# Patient Record
Sex: FEMALE | Race: WHITE | NOT HISPANIC OR LATINO
[De-identification: names, ages, dates, MRNs, and addresses within clinical notes are randomized per-mention and may not be internally consistent; named-entity substitution may affect disease eponyms.]

---

## 2017-02-21 ENCOUNTER — MESSAGE (OUTPATIENT)
Age: 47
End: 2017-02-21

## 2017-02-24 LAB
MEV IGG FLD QL IA: 18.9 AU/ML
MEV IGG+IGM SER-IMP: NEGATIVE
MUV AB SER-ACNC: POSITIVE
MUV IGG SER QL IA: 269 AU/ML
RUBV IGG FLD-ACNC: 11.8 INDEX
RUBV IGG SER-IMP: POSITIVE

## 2017-02-27 ENCOUNTER — APPOINTMENT (OUTPATIENT)
Dept: INTERNAL MEDICINE | Facility: CLINIC | Age: 47
End: 2017-02-27

## 2017-02-27 ENCOUNTER — OTHER (OUTPATIENT)
Age: 47
End: 2017-02-27

## 2017-03-06 ENCOUNTER — MESSAGE (OUTPATIENT)
Age: 47
End: 2017-03-06

## 2017-05-02 ENCOUNTER — APPOINTMENT (OUTPATIENT)
Dept: INTERNAL MEDICINE | Facility: CLINIC | Age: 47
End: 2017-05-02

## 2017-05-02 VITALS
HEART RATE: 57 BPM | OXYGEN SATURATION: 100 % | WEIGHT: 110 LBS | TEMPERATURE: 98.4 F | DIASTOLIC BLOOD PRESSURE: 71 MMHG | RESPIRATION RATE: 17 BRPM | HEIGHT: 64 IN | SYSTOLIC BLOOD PRESSURE: 104 MMHG | BODY MASS INDEX: 18.78 KG/M2

## 2017-06-04 ENCOUNTER — LABORATORY RESULT (OUTPATIENT)
Age: 47
End: 2017-06-04

## 2017-06-05 LAB
25(OH)D3 SERPL-MCNC: 32.1 NG/ML
ALBUMIN SERPL ELPH-MCNC: 3.9 G/DL
ALP BLD-CCNC: 36 U/L
ALT SERPL-CCNC: 21 U/L
ANION GAP SERPL CALC-SCNC: 16 MMOL/L
APPEARANCE: CLEAR
AST SERPL-CCNC: 20 U/L
BASOPHILS # BLD AUTO: 0.03 K/UL
BASOPHILS NFR BLD AUTO: 0.7 %
BILIRUB SERPL-MCNC: 0.4 MG/DL
BILIRUBIN URINE: NEGATIVE
BLOOD URINE: NEGATIVE
BUN SERPL-MCNC: 18 MG/DL
CALCIUM SERPL-MCNC: 9.3 MG/DL
CHLORIDE SERPL-SCNC: 101 MMOL/L
CHOLEST SERPL-MCNC: 125 MG/DL
CHOLEST/HDLC SERPL: 1.6 RATIO
CO2 SERPL-SCNC: 23 MMOL/L
COLOR: YELLOW
CREAT SERPL-MCNC: 0.81 MG/DL
EOSINOPHIL # BLD AUTO: 0.08 K/UL
EOSINOPHIL NFR BLD AUTO: 1.8 %
FOLATE SERPL-MCNC: 13.6 NG/ML
GLUCOSE QUALITATIVE U: NORMAL MG/DL
GLUCOSE SERPL-MCNC: 68 MG/DL
HBA1C MFR BLD HPLC: 5.3 %
HCT VFR BLD CALC: 38.1 %
HDLC SERPL-MCNC: 76 MG/DL
HGB BLD-MCNC: 12.1 G/DL
IMM GRANULOCYTES NFR BLD AUTO: 0.2 %
KETONES URINE: NEGATIVE
LDLC SERPL CALC-MCNC: 43 MG/DL
LEUKOCYTE ESTERASE URINE: NEGATIVE
LYMPHOCYTES # BLD AUTO: 1.91 K/UL
LYMPHOCYTES NFR BLD AUTO: 42.4 %
MAN DIFF?: NORMAL
MCHC RBC-ENTMCNC: 29.5 PG
MCHC RBC-ENTMCNC: 31.8 GM/DL
MCV RBC AUTO: 92.9 FL
MONOCYTES # BLD AUTO: 0.34 K/UL
MONOCYTES NFR BLD AUTO: 7.6 %
NEUTROPHILS # BLD AUTO: 2.13 K/UL
NEUTROPHILS NFR BLD AUTO: 47.3 %
NITRITE URINE: NEGATIVE
PH URINE: 7.5
PLATELET # BLD AUTO: 142 K/UL
POTASSIUM SERPL-SCNC: 4.6 MMOL/L
PROT SERPL-MCNC: 6.4 G/DL
PROTEIN URINE: NEGATIVE MG/DL
RBC # BLD: 4.1 M/UL
RBC # FLD: 12.8 %
SODIUM SERPL-SCNC: 140 MMOL/L
SPECIFIC GRAVITY URINE: 1.01
TRIGL SERPL-MCNC: 31 MG/DL
TSH SERPL-ACNC: 2.03 UIU/ML
UROBILINOGEN URINE: NORMAL MG/DL
VIT B12 SERPL-MCNC: 396 PG/ML
WBC # FLD AUTO: 4.5 K/UL

## 2017-12-26 ENCOUNTER — MESSAGE (OUTPATIENT)
Age: 47
End: 2017-12-26

## 2018-01-04 LAB
BASOPHILS # BLD AUTO: 0.03 K/UL
BASOPHILS NFR BLD AUTO: 0.5 %
EOSINOPHIL # BLD AUTO: 0.07 K/UL
EOSINOPHIL NFR BLD AUTO: 1.2 %
HCT VFR BLD CALC: 37.4 %
HGB BLD-MCNC: 12.4 G/DL
IMM GRANULOCYTES NFR BLD AUTO: 0.5 %
LYMPHOCYTES # BLD AUTO: 2.5 K/UL
LYMPHOCYTES NFR BLD AUTO: 42.7 %
MAN DIFF?: NORMAL
MCHC RBC-ENTMCNC: 30.2 PG
MCHC RBC-ENTMCNC: 33.2 GM/DL
MCV RBC AUTO: 91 FL
MONOCYTES # BLD AUTO: 0.44 K/UL
MONOCYTES NFR BLD AUTO: 7.5 %
NEUTROPHILS # BLD AUTO: 2.78 K/UL
NEUTROPHILS NFR BLD AUTO: 47.6 %
PLATELET # BLD AUTO: 139 K/UL
RBC # BLD: 4.11 M/UL
RBC # FLD: 13 %
WBC # FLD AUTO: 5.85 K/UL

## 2018-02-28 ENCOUNTER — TRANSCRIPTION ENCOUNTER (OUTPATIENT)
Age: 48
End: 2018-02-28

## 2018-07-24 ENCOUNTER — FORM ENCOUNTER (OUTPATIENT)
Age: 48
End: 2018-07-24

## 2018-07-25 ENCOUNTER — APPOINTMENT (OUTPATIENT)
Dept: INTERNAL MEDICINE | Facility: CLINIC | Age: 48
End: 2018-07-25
Payer: COMMERCIAL

## 2018-07-25 ENCOUNTER — APPOINTMENT (OUTPATIENT)
Dept: RADIOLOGY | Facility: CLINIC | Age: 48
End: 2018-07-25
Payer: COMMERCIAL

## 2018-07-25 ENCOUNTER — OUTPATIENT (OUTPATIENT)
Dept: OUTPATIENT SERVICES | Facility: HOSPITAL | Age: 48
LOS: 1 days | End: 2018-07-25
Payer: COMMERCIAL

## 2018-07-25 VITALS
TEMPERATURE: 97.3 F | RESPIRATION RATE: 17 BRPM | SYSTOLIC BLOOD PRESSURE: 110 MMHG | HEIGHT: 64 IN | DIASTOLIC BLOOD PRESSURE: 70 MMHG | OXYGEN SATURATION: 100 % | WEIGHT: 111 LBS | HEART RATE: 49 BPM | BODY MASS INDEX: 18.95 KG/M2

## 2018-07-25 DIAGNOSIS — Z00.00 ENCOUNTER FOR GENERAL ADULT MEDICAL EXAMINATION WITHOUT ABNORMAL FINDINGS: ICD-10-CM

## 2018-07-25 PROCEDURE — 71046 X-RAY EXAM CHEST 2 VIEWS: CPT | Mod: 26

## 2018-07-25 PROCEDURE — 99396 PREV VISIT EST AGE 40-64: CPT

## 2018-07-25 PROCEDURE — 71046 X-RAY EXAM CHEST 2 VIEWS: CPT

## 2018-07-30 LAB
25(OH)D3 SERPL-MCNC: 30 NG/ML
ALBUMIN SERPL ELPH-MCNC: 4.5 G/DL
ALP BLD-CCNC: 35 U/L
ALT SERPL-CCNC: 15 U/L
ANION GAP SERPL CALC-SCNC: 12 MMOL/L
AST SERPL-CCNC: 19 U/L
BASOPHILS # BLD AUTO: 0.03 K/UL
BASOPHILS NFR BLD AUTO: 0.7 %
BILIRUB SERPL-MCNC: 0.7 MG/DL
BUN SERPL-MCNC: 17 MG/DL
CALCIUM SERPL-MCNC: 8.8 MG/DL
CHLORIDE SERPL-SCNC: 101 MMOL/L
CHOLEST SERPL-MCNC: 131 MG/DL
CHOLEST/HDLC SERPL: 1.7 RATIO
CO2 SERPL-SCNC: 24 MMOL/L
CREAT SERPL-MCNC: 0.82 MG/DL
EOSINOPHIL # BLD AUTO: 0.06 K/UL
EOSINOPHIL NFR BLD AUTO: 1.5 %
GLUCOSE SERPL-MCNC: 90 MG/DL
HBA1C MFR BLD HPLC: 4.9 %
HCT VFR BLD CALC: 37.6 %
HCV AB SER QL: NONREACTIVE
HCV S/CO RATIO: 0.11 S/CO
HDLC SERPL-MCNC: 77 MG/DL
HGB BLD-MCNC: 12.6 G/DL
IMM GRANULOCYTES NFR BLD AUTO: 0.2 %
LDLC SERPL CALC-MCNC: 40 MG/DL
LYMPHOCYTES # BLD AUTO: 1.72 K/UL
LYMPHOCYTES NFR BLD AUTO: 42.2 %
MAN DIFF?: NORMAL
MCHC RBC-ENTMCNC: 30.2 PG
MCHC RBC-ENTMCNC: 33.5 GM/DL
MCV RBC AUTO: 90.2 FL
MONOCYTES # BLD AUTO: 0.28 K/UL
MONOCYTES NFR BLD AUTO: 6.9 %
NEUTROPHILS # BLD AUTO: 1.98 K/UL
NEUTROPHILS NFR BLD AUTO: 48.5 %
PLATELET # BLD AUTO: 150 K/UL
POTASSIUM SERPL-SCNC: 4.3 MMOL/L
PROT SERPL-MCNC: 6.3 G/DL
RBC # BLD: 4.17 M/UL
RBC # FLD: 13 %
SODIUM SERPL-SCNC: 138 MMOL/L
TRIGL SERPL-MCNC: 69 MG/DL
TSH SERPL-ACNC: 1.39 UIU/ML
VIT B12 SERPL-MCNC: 356 PG/ML
WBC # FLD AUTO: 4.08 K/UL

## 2018-07-31 LAB
M TB IFN-G BLD-IMP: NEGATIVE
QUANTIFERON TB PLUS MITOGEN MINUS NIL: >10 IU/ML
QUANTIFERON TB PLUS NIL: 0.03 IU/ML
QUANTIFERON TB PLUS TB1 MINUS NIL: 0 IU/ML
QUANTIFERON TB PLUS TB2 MINUS NIL: -0.01 IU/ML

## 2018-09-25 ENCOUNTER — APPOINTMENT (OUTPATIENT)
Dept: CARDIOLOGY | Facility: CLINIC | Age: 48
End: 2018-09-25
Payer: COMMERCIAL

## 2018-09-25 ENCOUNTER — NON-APPOINTMENT (OUTPATIENT)
Age: 48
End: 2018-09-25

## 2018-09-25 VITALS
HEIGHT: 64 IN | BODY MASS INDEX: 18.61 KG/M2 | RESPIRATION RATE: 17 BRPM | WEIGHT: 109 LBS | HEART RATE: 55 BPM | DIASTOLIC BLOOD PRESSURE: 65 MMHG | OXYGEN SATURATION: 100 % | SYSTOLIC BLOOD PRESSURE: 103 MMHG | TEMPERATURE: 98.3 F

## 2018-09-25 PROCEDURE — 93000 ELECTROCARDIOGRAM COMPLETE: CPT

## 2018-09-25 PROCEDURE — 99244 OFF/OP CNSLTJ NEW/EST MOD 40: CPT

## 2018-10-09 ENCOUNTER — APPOINTMENT (OUTPATIENT)
Dept: CARDIOLOGY | Facility: CLINIC | Age: 48
End: 2018-10-09
Payer: COMMERCIAL

## 2018-10-09 VITALS
HEIGHT: 64 IN | SYSTOLIC BLOOD PRESSURE: 108 MMHG | TEMPERATURE: 98.6 F | DIASTOLIC BLOOD PRESSURE: 73 MMHG | RESPIRATION RATE: 17 BRPM | WEIGHT: 110 LBS | BODY MASS INDEX: 18.78 KG/M2 | HEART RATE: 85 BPM | OXYGEN SATURATION: 100 %

## 2018-10-09 PROCEDURE — 99212 OFFICE O/P EST SF 10 MIN: CPT

## 2018-10-09 PROCEDURE — 93306 TTE W/DOPPLER COMPLETE: CPT

## 2019-09-15 LAB
ALBUMIN SERPL ELPH-MCNC: 4.3 G/DL
ALP BLD-CCNC: 31 U/L
ALT SERPL-CCNC: 16 U/L
ANION GAP SERPL CALC-SCNC: 11 MMOL/L
AST SERPL-CCNC: 19 U/L
BASOPHILS # BLD AUTO: 0.06 K/UL
BASOPHILS NFR BLD AUTO: 1.2 %
BILIRUB SERPL-MCNC: 0.3 MG/DL
BUN SERPL-MCNC: 13 MG/DL
CALCIUM SERPL-MCNC: 9 MG/DL
CHLORIDE SERPL-SCNC: 104 MMOL/L
CHOLEST SERPL-MCNC: 150 MG/DL
CHOLEST/HDLC SERPL: 1.6 RATIO
CO2 SERPL-SCNC: 25 MMOL/L
CREAT SERPL-MCNC: 0.77 MG/DL
EOSINOPHIL # BLD AUTO: 0.07 K/UL
EOSINOPHIL NFR BLD AUTO: 1.4 %
ESTIMATED AVERAGE GLUCOSE: 97 MG/DL
GLUCOSE SERPL-MCNC: 86 MG/DL
HBA1C MFR BLD HPLC: 5 %
HCT VFR BLD CALC: 40.5 %
HDLC SERPL-MCNC: 94 MG/DL
HGB BLD-MCNC: 13 G/DL
IMM GRANULOCYTES NFR BLD AUTO: 0.2 %
LDLC SERPL CALC-MCNC: 50 MG/DL
LYMPHOCYTES # BLD AUTO: 1.7 K/UL
LYMPHOCYTES NFR BLD AUTO: 34.3 %
MAN DIFF?: NORMAL
MCHC RBC-ENTMCNC: 30.3 PG
MCHC RBC-ENTMCNC: 32.1 GM/DL
MCV RBC AUTO: 94.4 FL
MONOCYTES # BLD AUTO: 0.45 K/UL
MONOCYTES NFR BLD AUTO: 9.1 %
NEUTROPHILS # BLD AUTO: 2.67 K/UL
NEUTROPHILS NFR BLD AUTO: 53.8 %
PLATELET # BLD AUTO: 158 K/UL
POTASSIUM SERPL-SCNC: 4.2 MMOL/L
PROT SERPL-MCNC: 6.2 G/DL
RBC # BLD: 4.29 M/UL
RBC # FLD: 12.7 %
SODIUM SERPL-SCNC: 140 MMOL/L
TRIGL SERPL-MCNC: 32 MG/DL
TSH SERPL-ACNC: 1.77 UIU/ML
WBC # FLD AUTO: 4.96 K/UL

## 2019-09-19 ENCOUNTER — APPOINTMENT (OUTPATIENT)
Dept: INTERNAL MEDICINE | Facility: CLINIC | Age: 49
End: 2019-09-19
Payer: COMMERCIAL

## 2019-09-19 VITALS
BODY MASS INDEX: 18.78 KG/M2 | HEIGHT: 64 IN | HEART RATE: 55 BPM | DIASTOLIC BLOOD PRESSURE: 73 MMHG | WEIGHT: 110 LBS | TEMPERATURE: 98.2 F | RESPIRATION RATE: 17 BRPM | SYSTOLIC BLOOD PRESSURE: 112 MMHG | OXYGEN SATURATION: 100 %

## 2019-09-19 DIAGNOSIS — Z12.11 ENCOUNTER FOR SCREENING FOR MALIGNANT NEOPLASM OF COLON: ICD-10-CM

## 2019-09-19 DIAGNOSIS — Z12.12 ENCOUNTER FOR SCREENING FOR MALIGNANT NEOPLASM OF COLON: ICD-10-CM

## 2019-09-19 DIAGNOSIS — Z12.4 ENCOUNTER FOR SCREENING FOR MALIGNANT NEOPLASM OF CERVIX: ICD-10-CM

## 2019-09-19 DIAGNOSIS — Z00.00 ENCOUNTER FOR GENERAL ADULT MEDICAL EXAMINATION W/OUT ABNORMAL FINDINGS: ICD-10-CM

## 2019-09-19 PROCEDURE — 90471 IMMUNIZATION ADMIN: CPT

## 2019-09-19 PROCEDURE — G0444 DEPRESSION SCREEN ANNUAL: CPT

## 2019-09-19 PROCEDURE — 99396 PREV VISIT EST AGE 40-64: CPT | Mod: 25

## 2019-09-19 PROCEDURE — 90686 IIV4 VACC NO PRSV 0.5 ML IM: CPT

## 2019-10-22 ENCOUNTER — APPOINTMENT (OUTPATIENT)
Dept: GASTROENTEROLOGY | Facility: CLINIC | Age: 49
End: 2019-10-22

## 2020-01-09 ENCOUNTER — NON-APPOINTMENT (OUTPATIENT)
Age: 50
End: 2020-01-09

## 2020-01-09 ENCOUNTER — APPOINTMENT (OUTPATIENT)
Dept: INTERNAL MEDICINE | Facility: CLINIC | Age: 50
End: 2020-01-09
Payer: COMMERCIAL

## 2020-01-09 VITALS
HEIGHT: 64 IN | TEMPERATURE: 98.5 F | OXYGEN SATURATION: 100 % | HEART RATE: 61 BPM | WEIGHT: 112 LBS | BODY MASS INDEX: 19.12 KG/M2 | DIASTOLIC BLOOD PRESSURE: 57 MMHG | SYSTOLIC BLOOD PRESSURE: 102 MMHG

## 2020-01-09 DIAGNOSIS — Z83.2 FAMILY HISTORY OF DISEASES OF THE BLOOD AND BLOOD-FORMING ORGANS AND CERTAIN DISORDERS INVOLVING THE IMMUNE MECHANISM: ICD-10-CM

## 2020-01-09 PROCEDURE — 99215 OFFICE O/P EST HI 40 MIN: CPT | Mod: 25

## 2020-01-09 PROCEDURE — 93000 ELECTROCARDIOGRAM COMPLETE: CPT

## 2020-01-10 LAB
ALBUMIN SERPL ELPH-MCNC: 4.8 G/DL
ALP BLD-CCNC: 33 U/L
ALT SERPL-CCNC: 11 U/L
ANION GAP SERPL CALC-SCNC: 12 MMOL/L
AST SERPL-CCNC: 16 U/L
BASOPHILS # BLD AUTO: 0.06 K/UL
BASOPHILS NFR BLD AUTO: 1.1 %
BILIRUB SERPL-MCNC: 0.6 MG/DL
BUN SERPL-MCNC: 12 MG/DL
CALCIUM SERPL-MCNC: 9.8 MG/DL
CHLORIDE SERPL-SCNC: 101 MMOL/L
CO2 SERPL-SCNC: 26 MMOL/L
CREAT SERPL-MCNC: 0.68 MG/DL
EOSINOPHIL # BLD AUTO: 0.05 K/UL
EOSINOPHIL NFR BLD AUTO: 0.9 %
GLUCOSE SERPL-MCNC: 72 MG/DL
HCT VFR BLD CALC: 42.1 %
HGB BLD-MCNC: 13.4 G/DL
IMM GRANULOCYTES NFR BLD AUTO: 0.6 %
LYMPHOCYTES # BLD AUTO: 1.89 K/UL
LYMPHOCYTES NFR BLD AUTO: 35.4 %
MAN DIFF?: NORMAL
MCHC RBC-ENTMCNC: 29.8 PG
MCHC RBC-ENTMCNC: 31.8 GM/DL
MCV RBC AUTO: 93.6 FL
MONOCYTES # BLD AUTO: 0.43 K/UL
MONOCYTES NFR BLD AUTO: 8.1 %
NEUTROPHILS # BLD AUTO: 2.88 K/UL
NEUTROPHILS NFR BLD AUTO: 53.9 %
PLATELET # BLD AUTO: 146 K/UL
POTASSIUM SERPL-SCNC: 5.2 MMOL/L
PROT SERPL-MCNC: 6.8 G/DL
RBC # BLD: 4.5 M/UL
RBC # FLD: 12.5 %
SODIUM SERPL-SCNC: 139 MMOL/L
WBC # FLD AUTO: 5.34 K/UL

## 2020-02-11 ENCOUNTER — APPOINTMENT (OUTPATIENT)
Dept: INTERNAL MEDICINE | Facility: CLINIC | Age: 50
End: 2020-02-11
Payer: COMMERCIAL

## 2020-02-11 VITALS
BODY MASS INDEX: 20.14 KG/M2 | HEART RATE: 66 BPM | HEIGHT: 64 IN | TEMPERATURE: 98.2 F | OXYGEN SATURATION: 96 % | RESPIRATION RATE: 17 BRPM | DIASTOLIC BLOOD PRESSURE: 75 MMHG | WEIGHT: 118 LBS | SYSTOLIC BLOOD PRESSURE: 123 MMHG

## 2020-02-11 DIAGNOSIS — Z86.79 PERSONAL HISTORY OF OTHER DISEASES OF THE CIRCULATORY SYSTEM: ICD-10-CM

## 2020-02-11 DIAGNOSIS — Z86.39 PERSONAL HISTORY OF OTHER ENDOCRINE, NUTRITIONAL AND METABOLIC DISEASE: ICD-10-CM

## 2020-02-11 DIAGNOSIS — N60.11 DIFFUSE CYSTIC MASTOPATHY OF LEFT BREAST: ICD-10-CM

## 2020-02-11 DIAGNOSIS — M54.5 LOW BACK PAIN: ICD-10-CM

## 2020-02-11 DIAGNOSIS — R20.8 OTHER DISTURBANCES OF SKIN SENSATION: ICD-10-CM

## 2020-02-11 DIAGNOSIS — Z12.31 ENCOUNTER FOR SCREENING MAMMOGRAM FOR MALIGNANT NEOPLASM OF BREAST: ICD-10-CM

## 2020-02-11 DIAGNOSIS — Z23 ENCOUNTER FOR IMMUNIZATION: ICD-10-CM

## 2020-02-11 DIAGNOSIS — N60.12 DIFFUSE CYSTIC MASTOPATHY OF LEFT BREAST: ICD-10-CM

## 2020-02-11 DIAGNOSIS — D69.1 QUALITATIVE PLATELET DEFECTS: ICD-10-CM

## 2020-02-11 DIAGNOSIS — Z01.818 ENCOUNTER FOR OTHER PREPROCEDURAL EXAMINATION: ICD-10-CM

## 2020-02-11 DIAGNOSIS — Z00.00 ENCOUNTER FOR GENERAL ADULT MEDICAL EXAMINATION W/OUT ABNORMAL FINDINGS: ICD-10-CM

## 2020-02-11 PROCEDURE — 99214 OFFICE O/P EST MOD 30 MIN: CPT

## 2020-02-12 ENCOUNTER — FORM ENCOUNTER (OUTPATIENT)
Age: 50
End: 2020-02-12

## 2020-02-13 ENCOUNTER — TRANSCRIPTION ENCOUNTER (OUTPATIENT)
Age: 50
End: 2020-02-13

## 2020-02-13 ENCOUNTER — APPOINTMENT (OUTPATIENT)
Dept: MRI IMAGING | Facility: CLINIC | Age: 50
End: 2020-02-13
Payer: COMMERCIAL

## 2020-02-13 ENCOUNTER — OUTPATIENT (OUTPATIENT)
Dept: OUTPATIENT SERVICES | Facility: HOSPITAL | Age: 50
LOS: 1 days | End: 2020-02-13
Payer: COMMERCIAL

## 2020-02-13 DIAGNOSIS — Z00.8 ENCOUNTER FOR OTHER GENERAL EXAMINATION: ICD-10-CM

## 2020-02-13 PROCEDURE — 72148 MRI LUMBAR SPINE W/O DYE: CPT | Mod: 26

## 2020-02-13 PROCEDURE — 72148 MRI LUMBAR SPINE W/O DYE: CPT

## 2020-02-18 ENCOUNTER — APPOINTMENT (OUTPATIENT)
Dept: ORTHOPEDIC SURGERY | Facility: CLINIC | Age: 50
End: 2020-02-18
Payer: COMMERCIAL

## 2020-02-18 VITALS
BODY MASS INDEX: 19.12 KG/M2 | WEIGHT: 112 LBS | SYSTOLIC BLOOD PRESSURE: 123 MMHG | HEIGHT: 64 IN | DIASTOLIC BLOOD PRESSURE: 78 MMHG | HEART RATE: 61 BPM

## 2020-02-18 DIAGNOSIS — Z78.9 OTHER SPECIFIED HEALTH STATUS: ICD-10-CM

## 2020-02-18 PROCEDURE — 99244 OFF/OP CNSLTJ NEW/EST MOD 40: CPT

## 2020-02-18 RX ORDER — PNV NO.95/FERROUS FUM/FOLIC AC 28MG-0.8MG
TABLET ORAL
Refills: 0 | Status: ACTIVE | COMMUNITY

## 2020-02-18 NOTE — PHYSICAL EXAM
[de-identified] : Constitutional: Well-nourished, well-developed, No acute distress\par Respiratory:  Good respiratory effort, no SOB\par Lymphatic: No regional lymphadenopathy, no lymphedema\par Psychiatric: Pleasant and normal affect, alert and oriented x3\par Skin: Clean dry and intact B/L UE/LE\par Musculoskeletal: normal except where as noted in regional exam\par \par Lumbar Spine Exam\par \par APPEARANCE: no marked deformities or malalignment, normal curvature of the lumbosacral spine. no marked deformities. good posture\par POSITIVE TENDERNESS: + Left lumbar tenderness and spasm noted in erector spinae and quadratus lumborum\par NONTENDER: no bony midline tenderness, no marked tenderness in right lumbar musculature.\par ROM: full, although painful at end range of flexion and extension\par RESISTIVE TESTING: painless 5/5 resisted flex/ext, sidebending b/l, and rotation\par SPECIAL TESTS: neg SLR b/l, neg ELIEL b/l, neg Trendelenburg b/l \par PULSES: 2+ DP/PT pulses\par NEURO:  L1 - S2 intact to sensation and motor, DTRs 2+/4 patella and achilles\par \par B/L Hips: No asymmetry, malalignment, or swelling, Full ROM, 5/5 strength in flexion/ext, IR/ER, Abd/Add, Joints stable\par B/L Knees: No asymmetry, malalignment, or swelling, Full ROM, 5/5 strength in flexion/ext, Joints stable\par B/L Ankles: No asymmetry, malalignment, or swelling, Full ROM, 5/5 strength in DF/PF/Inv/Ev, Joints stable\par BIOMECHANICAL EXAM: no marked leg length discrepancy, mild hip abductor weakness b/l, no marked foot pronation, Normal gait and station\par \par \par  [de-identified] : I reviewed and clinically correlated the following outside imaging studies,\par  \par EXAM: MR SPINE LUMBAR \par \par \par PROCEDURE DATE: 02/13/2020 \par \par \par \par INTERPRETATION: \par LUMBOSACRAL SPINE MRI \par \par CLINICAL INFORMATION: Lower back pain with left foot numbness for 6 weeks. \par TECHNIQUE: Multiplanar, multisequence MR imaging was obtained of the \par lumbosacral spine. \par COMPARISON: None available. \par FINDINGS: \par \par DISC LEVEL EVALUATION: \par \par T11/T12: Evaluated only in the sagittal plane. No central canal narrowing. \par T12/L1: No central canal, lateral recess or foraminal narrowing. \par L1/L2: No central canal, lateral recess or foraminal narrowing. \par L2/L3: No central canal, lateral recess or foraminal narrowing. \par L3/L4: No central canal, lateral recess or foraminal narrowing. \par L4/L5: Moderate left and mild right facet degenerative change. Small central \par annular tear. No central canal narrowing or foraminal narrowing. \par L5/S1: Small annular tear. No central canal, lateral recess or foraminal \par narrowing. \par \par SPINAL ALIGNMENT: Maintained. \par DISTAL CORD AND CONUS: The spinal cord ends at the L1-2 level. No abnormal \par signal within the conus. \par SI JOINTS: No articular abnormality. \par MARROW: There is a Schmorl's node along the superior endplate of L1. \par PARASPINAL MUSCLE AND SOFT TISSUES: Symmetric without edema or significant \par atrophy. Mild fatty infiltration. \par INTRAABDOMINAL/INTRAPELVIC SOFT TISSUES: Unremarkable. \par \par IMPRESSION: \par \par 1. Mild spondylosis at L4-5 and L5-S1 as detailed above

## 2020-02-18 NOTE — CONSULT LETTER
[Consult Letter:] : I had the pleasure of evaluating your patient, [unfilled]. [Dear  ___] : Dear  [unfilled], [Sincerely,] : Sincerely, [Please see my note below.] : Please see my note below. [FreeTextEntry2] : PCP [FreeTextEntry3] : Kevin Alves DO, ATC\par Primary Care Sports Medicine\[facility]

## 2020-02-18 NOTE — HISTORY OF PRESENT ILLNESS
[de-identified] : Patient is here for LBP that began about 6 months ago without inciting injury. She injured her knee in 11/19. She had a partial meniscotomy performed in 01/20. Around 12/19, her LBP worsened. She has been experiencing pain in her low back as well as numbness on the lateral/plantar aspect of her left foot. She also describes "tightness" in her left leg. She took OTC NSAIDs initially but is no longer taking pain medication. She has done exercises for treatment. Her pain is worse with standing.  She saw her PCP for this issue who sent her for an MRI. Denies prior injury. She works as a PT\par \par The patient's past medical history, past surgical history, medications and allergies were reviewed by me today and documented accordingly. In addition, the patient's family and social history, which were noncontributory to this visit, were reviewed also. Intake form was reviewed. The patient has no family history of arthritis.

## 2020-02-18 NOTE — DISCUSSION/SUMMARY
[de-identified] : Discussed findings of today's exam and possible causes of patient's pain.  Educated patient on their most probable diagnosis of left lumbar radiculopathy.  Reviewed possible courses of treatment, and we collaboratively decided best course of treatment at this time will include conservative management. Patient has been having chronic left lumbar radiculopathy with radiation into the foot, she has MRI findings with small disc herniation in the corresponding location. 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 Patient has likely facet compression and may benefit from facet epidural steroid injection. At this time recommend physiatry consultation for diagnostic/therapeutic injection.  Patient appreciates and agrees with current plan.\par \par This note was generated using dragon medical dictation software.  A reasonable effort has been made for proofreading its contents, but typos may still remain.  If there are any questions or points of clarification needed please notify my office.

## 2020-02-25 RX ORDER — MELOXICAM 15 MG/1
15 TABLET ORAL
Qty: 30 | Refills: 0 | Status: ACTIVE | COMMUNITY
Start: 2020-02-25 | End: 1900-01-01

## 2020-03-20 ENCOUNTER — APPOINTMENT (OUTPATIENT)
Dept: PHYSICAL MEDICINE AND REHAB | Facility: CLINIC | Age: 50
End: 2020-03-20
Payer: COMMERCIAL

## 2020-03-20 VITALS
WEIGHT: 112 LBS | HEART RATE: 69 BPM | HEIGHT: 64 IN | BODY MASS INDEX: 19.12 KG/M2 | OXYGEN SATURATION: 100 % | TEMPERATURE: 97.8 F

## 2020-03-20 PROCEDURE — 99204 OFFICE O/P NEW MOD 45 MIN: CPT

## 2020-03-23 NOTE — PHYSICAL EXAM
[FreeTextEntry1] : \par  [Normal] : Oriented to person, place, and time, insight and judgement were intact and the affect was normal [de-identified] : No gross deformity, TTP of the lumbar paraspinals: left ROM limited with flexion, SLR positive: left

## 2020-03-23 NOTE — ASSESSMENT
[FreeTextEntry1] : Epidural steroid injection is medically necessary given the persistent and severe low back pain. Patient complains of radicular pain down to left leg. MRI scan is consistent with Lumbar Radiculopathy. The symptom greatly affect patient's daily life and function. Patient's symptom and physical examination are consistent with the diagnosis of lumbar radiculopathy. Patient has failed physician guided conservative treatments for over 6 weeks therefore an epidural steroid injection is the next step in management of the patient's symptoms.\par \par \par Left L4/5 ILESI

## 2020-03-23 NOTE — HISTORY OF PRESENT ILLNESS
[FreeTextEntry1] : Location: left foot \par Severity:2\par Quality:numbness, pins and needles \par Context right knee orthoscopic surgery of January 16 2020\par Duration: pain started December of 2019 \par Frequency : coming and going\par Aggravating Factors: making a problem or offense worse or more serious. resting or flexion type of activity \par Conservative treatment tried: Physcial therapy started a month ago will go fpr 2x a week\par \par Associated Symptoms (numbness, paresthesia, bowel/urine incontinence, swelling in joint, etc): paresthesia in the LLE\par \par Prior Studies (if applicable): MRI shows herniated disc at L4/5\par \par

## 2020-03-31 ENCOUNTER — APPOINTMENT (OUTPATIENT)
Dept: PHYSICAL MEDICINE AND REHAB | Facility: CLINIC | Age: 50
End: 2020-03-31

## 2020-03-31 ENCOUNTER — OUTPATIENT (OUTPATIENT)
Dept: OUTPATIENT SERVICES | Facility: HOSPITAL | Age: 50
LOS: 1 days | End: 2020-03-31
Payer: COMMERCIAL

## 2020-03-31 DIAGNOSIS — M54.16 RADICULOPATHY, LUMBAR REGION: ICD-10-CM

## 2020-03-31 PROCEDURE — 62323 NJX INTERLAMINAR LMBR/SAC: CPT

## 2020-04-28 ENCOUNTER — APPOINTMENT (OUTPATIENT)
Dept: PHYSICAL MEDICINE AND REHAB | Facility: CLINIC | Age: 50
End: 2020-04-28

## 2020-05-01 ENCOUNTER — APPOINTMENT (OUTPATIENT)
Dept: PHYSICAL MEDICINE AND REHAB | Facility: CLINIC | Age: 50
End: 2020-05-01
Payer: COMMERCIAL

## 2020-05-01 PROCEDURE — 99214 OFFICE O/P EST MOD 30 MIN: CPT | Mod: 95

## 2020-05-05 NOTE — PHYSICAL EXAM
[FreeTextEntry1] : General: NAD, sitting comfortably in good spirits\par Eyes: Normal sclera, EOMI\par Lymphatic: no obvious edema\par Cardiovascular: no cyanosis\par Skin: Fully clothed, no gross abnormalities\par Neurological/Psychiatric: Sensation intact,  Alert and Oriented to person, place and time\par MSK:\par Gait: Ambulating with no assistive device\par LUE: no gross abnormalities. Normal ROM.  At least antigravity\par RUE: no gross abnormalities. Normal ROM.  At least antigravity\par LLE: no gross abnormalities. Normal ROM.  At least antigravity\par RLE: no gross abnormalities. Normal ROM.  At least antigravity\par Lumbar Spine: No gross deformity, TTP of the lumbar paraspinals: left ROM limited with flexion, SLR positive: left\par  [Normal] : Oriented to person, place, and time, insight and judgement were intact and the affect was normal [de-identified] : No gross deformity, TTP of the lumbar paraspinals: left ROM limited with flexion, SLR positive: left

## 2020-05-05 NOTE — HISTORY OF PRESENT ILLNESS
[Home] : at home, [unfilled] , at the time of the visit. [Other Location: e.g. Home (Enter Location, City,State)___] : at [unfilled] [Patient] : the patient [Self] : self [FreeTextEntry1] : Patient presents for follow up and notes she had 100% improvement for about 2 days and then pain returned.

## 2020-05-05 NOTE — ASSESSMENT
[FreeTextEntry1] : repeat MELODY but will use transforaminal approach.\par \par Left L4/5 L5/S1 TFESI

## 2020-05-12 ENCOUNTER — APPOINTMENT (OUTPATIENT)
Dept: PHYSICAL MEDICINE AND REHAB | Facility: CLINIC | Age: 50
End: 2020-05-12

## 2020-05-12 ENCOUNTER — OUTPATIENT (OUTPATIENT)
Dept: OUTPATIENT SERVICES | Facility: HOSPITAL | Age: 50
LOS: 1 days | End: 2020-05-12
Payer: COMMERCIAL

## 2020-05-12 DIAGNOSIS — M54.16 RADICULOPATHY, LUMBAR REGION: ICD-10-CM

## 2020-05-12 PROCEDURE — 64484 NJX AA&/STRD TFRM EPI L/S EA: CPT | Mod: LT

## 2020-05-12 PROCEDURE — 64483 NJX AA&/STRD TFRM EPI L/S 1: CPT | Mod: LT

## 2020-05-12 PROCEDURE — 64484 NJX AA&/STRD TFRM EPI L/S EA: CPT

## 2020-05-12 PROCEDURE — 64483 NJX AA&/STRD TFRM EPI L/S 1: CPT

## 2020-05-13 PROBLEM — M54.16 LEFT LUMBAR RADICULOPATHY: Status: ACTIVE | Noted: 2020-02-18

## 2020-07-18 ENCOUNTER — RESULT REVIEW (OUTPATIENT)
Age: 50
End: 2020-07-18

## 2020-10-14 ENCOUNTER — APPOINTMENT (OUTPATIENT)
Dept: ORTHOPEDIC SURGERY | Facility: CLINIC | Age: 50
End: 2020-10-14
Payer: COMMERCIAL

## 2020-10-14 VITALS
HEART RATE: 72 BPM | BODY MASS INDEX: 18.78 KG/M2 | SYSTOLIC BLOOD PRESSURE: 109 MMHG | WEIGHT: 110 LBS | DIASTOLIC BLOOD PRESSURE: 68 MMHG | HEIGHT: 64 IN

## 2020-10-14 PROCEDURE — 99214 OFFICE O/P EST MOD 30 MIN: CPT

## 2020-10-23 NOTE — DISCUSSION/SUMMARY
[de-identified] : We discussed further treatment options.  This time she wishes to continue with nonsurgical treatment.  She will let me know of any changes or worsening of her symptoms.

## 2020-10-23 NOTE — HISTORY OF PRESENT ILLNESS
[de-identified] : Ms. FABRIZIO ALVAREZ  is a 50 year old female who presents with back pain and left foot/4th and 5th digit numbness without any specific cause or trauma.  Denies any pain, numbness, or tingling in her legs  Normal bowel and bladder control.   Denies any recent fevers, chills, sweats, weight loss, or infection.  She has done physical therapy which will help and give some temporary relief.  She has done injections in her back with minimal relief. \par \par The patients past medical history, past surgical history, medications, allergies, and social history were reviewed by me today with the patient and documented accordingly.  In addition, the patient's family history, which is noncontributory to their visit, was also reviewed.\par

## 2020-10-23 NOTE — PHYSICAL EXAM
[de-identified] : Examination of the lumbar spine reveals no midline tenderness palpation, step-offs, or skin lesions. Decreased range of motion with respect to flexion, extension, lateral bending, and rotation. No tenderness to palpation of the sciatic notch. No tenderness palpation of the bilateral greater trochanters. No pain with passive internal/external rotation of the hips. No instability of bilateral lower extremities.  Negative ELIEL. Negative straight leg raise bilaterally. No bowstring. Negative femoral stretch. 5 out of 5 iliopsoas, hip abductors, hips adductors, quadriceps, hamstrings, gastrocsoleus, tibialis anterior, extensor hallucis longus, peroneals. Grossly intact sensation to light touch bilateral lower extremities. 1+ patellar and Achilles reflexes. Downgoing Babinski. No clonus. Intact proprioception. Palpable pulses. No skin lesion and no edema on the right and left lower extremities. [de-identified] : Lumbar MRI reveals degenerative changes most pronounced at L4-5

## 2020-10-30 ENCOUNTER — APPOINTMENT (OUTPATIENT)
Dept: ORTHOPEDIC SURGERY | Facility: CLINIC | Age: 50
End: 2020-10-30
Payer: COMMERCIAL

## 2020-10-30 DIAGNOSIS — M48.07 SPINAL STENOSIS, LUMBOSACRAL REGION: ICD-10-CM

## 2020-10-30 DIAGNOSIS — M54.16 RADICULOPATHY, LUMBAR REGION: ICD-10-CM

## 2020-10-30 DIAGNOSIS — M51.36 OTHER INTERVERTEBRAL DISC DEGENERATION, LUMBAR REGION: ICD-10-CM

## 2020-10-30 PROCEDURE — 99072 ADDL SUPL MATRL&STAF TM PHE: CPT

## 2020-10-30 PROCEDURE — 99214 OFFICE O/P EST MOD 30 MIN: CPT

## 2021-02-09 PROBLEM — M51.36 DISC DEGENERATION, LUMBAR: Status: ACTIVE | Noted: 2020-10-23

## 2021-02-09 PROBLEM — M48.07 LUMBOSACRAL STENOSIS: Status: ACTIVE | Noted: 2021-02-09

## 2021-02-09 PROBLEM — M54.16 LUMBAR RADICULOPATHY: Status: ACTIVE | Noted: 2020-10-23

## 2021-02-09 NOTE — PHYSICAL EXAM
[de-identified] : Examination of the lumbar spine reveals no midline tenderness palpation, step-offs, or skin lesions. Decreased range of motion with respect to flexion, extension, lateral bending, and rotation. No tenderness to palpation of the sciatic notch. No tenderness palpation of the bilateral greater trochanters. No pain with passive internal/external rotation of the hips. No instability of bilateral lower extremities.  Negative ELIEL. Negative straight leg raise bilaterally. No bowstring. Negative femoral stretch. 5 out of 5 iliopsoas, hip abductors, hips adductors, quadriceps, hamstrings, gastrocsoleus, tibialis anterior, extensor hallucis longus, peroneals. Grossly intact sensation to light touch bilateral lower extremities. 1+ patellar and Achilles reflexes. Downgoing Babinski. No clonus. Intact proprioception. Palpable pulses. No skin lesion and no edema on the right and left lower extremities. [de-identified] : Updated lumbar spine MRI reveals degenerative disc disease L4-5 and L5-S1.  There is some facet hypertrophy and some increased foraminal stenosis

## 2021-02-09 NOTE — HISTORY OF PRESENT ILLNESS
[de-identified] : Patient returns for follow-up today.  She has had an updated lumbar spine MRI.\par \par The patients past medical history, past surgical history, medications, allergies, and social history were reviewed by me today with the patient and documented accordingly.  In addition, the patient's family history, which is noncontributory to their visit, was also reviewed.\par

## 2021-02-09 NOTE — DISCUSSION/SUMMARY
[de-identified] : We discussed further treatment options both nonsurgical and surgical.  At this point she wishes to continue with nonsurgical treatment.  She will let me know of any changes or worsening of her symptoms.

## 2022-10-07 ENCOUNTER — RESULT REVIEW (OUTPATIENT)
Age: 52
End: 2022-10-07

## 2022-12-30 ENCOUNTER — NON-APPOINTMENT (OUTPATIENT)
Age: 52
End: 2022-12-30

## 2023-12-22 ENCOUNTER — APPOINTMENT (OUTPATIENT)
Dept: MAMMOGRAPHY | Facility: IMAGING CENTER | Age: 53
End: 2023-12-22
Payer: COMMERCIAL

## 2023-12-22 ENCOUNTER — RESULT REVIEW (OUTPATIENT)
Age: 53
End: 2023-12-22

## 2023-12-22 ENCOUNTER — OUTPATIENT (OUTPATIENT)
Dept: OUTPATIENT SERVICES | Facility: HOSPITAL | Age: 53
LOS: 1 days | End: 2023-12-22
Payer: COMMERCIAL

## 2023-12-22 DIAGNOSIS — R92.1 MAMMOGRAPHIC CALCIFICATION FOUND ON DIAGNOSTIC IMAGING OF BREAST: ICD-10-CM

## 2023-12-22 PROCEDURE — 19082 BX BREAST ADD LESION STRTCTC: CPT

## 2023-12-22 PROCEDURE — 77065 DX MAMMO INCL CAD UNI: CPT | Mod: 26,RT

## 2023-12-22 PROCEDURE — 19081 BX BREAST 1ST LESION STRTCTC: CPT

## 2023-12-22 PROCEDURE — 77065 DX MAMMO INCL CAD UNI: CPT

## 2023-12-22 PROCEDURE — 19082 BX BREAST ADD LESION STRTCTC: CPT | Mod: RT

## 2023-12-22 PROCEDURE — 88305 TISSUE EXAM BY PATHOLOGIST: CPT

## 2023-12-22 PROCEDURE — 88305 TISSUE EXAM BY PATHOLOGIST: CPT | Mod: 26

## 2023-12-22 PROCEDURE — 19081 BX BREAST 1ST LESION STRTCTC: CPT | Mod: RT

## 2023-12-22 PROCEDURE — A4648: CPT

## 2023-12-28 ENCOUNTER — NON-APPOINTMENT (OUTPATIENT)
Age: 53
End: 2023-12-28

## 2023-12-28 LAB
SURGICAL PATHOLOGY STUDY: SIGNIFICANT CHANGE UP
SURGICAL PATHOLOGY STUDY: SIGNIFICANT CHANGE UP

## 2024-01-22 ENCOUNTER — OUTPATIENT (OUTPATIENT)
Dept: OUTPATIENT SERVICES | Facility: HOSPITAL | Age: 54
LOS: 1 days | Discharge: HOME | End: 2024-01-22

## 2024-01-22 DIAGNOSIS — R92.1 MAMMOGRAPHIC CALCIFICATION FOUND ON DIAGNOSTIC IMAGING OF BREAST: ICD-10-CM

## 2024-01-29 ENCOUNTER — APPOINTMENT (OUTPATIENT)
Dept: HEMATOLOGY ONCOLOGY | Facility: CLINIC | Age: 54
End: 2024-01-29
Payer: COMMERCIAL

## 2024-01-29 VITALS
TEMPERATURE: 97 F | OXYGEN SATURATION: 99 % | RESPIRATION RATE: 16 BRPM | HEART RATE: 57 BPM | WEIGHT: 113.74 LBS | SYSTOLIC BLOOD PRESSURE: 112 MMHG | BODY MASS INDEX: 19.42 KG/M2 | DIASTOLIC BLOOD PRESSURE: 74 MMHG | HEIGHT: 63.98 IN

## 2024-01-29 DIAGNOSIS — M85.80 OTHER SPECIFIED DISORDERS OF BONE DENSITY AND STRUCTURE, UNSPECIFIED SITE: ICD-10-CM

## 2024-01-29 DIAGNOSIS — Z78.0 OTHER SPECIFIED DISORDERS OF BONE DENSITY AND STRUCTURE, UNSPECIFIED SITE: ICD-10-CM

## 2024-01-29 DIAGNOSIS — R92.1 MAMMOGRAPHIC CALCIFICATION FOUND ON DIAGNOSTIC IMAGING OF BREAST: ICD-10-CM

## 2024-01-29 PROCEDURE — 99205 OFFICE O/P NEW HI 60 MIN: CPT

## 2024-01-29 RX ORDER — RALOXIFENE HYDROCHLORIDE 60 MG/1
60 TABLET, FILM COATED ORAL
Qty: 30 | Refills: 3 | Status: ACTIVE | COMMUNITY
Start: 2024-01-29 | End: 1900-01-01

## 2024-01-30 NOTE — ASSESSMENT
[FreeTextEntry1] : ABBIE  is a 54 year   year  year old female who presented today to review her risk of developing of breast cancer based on her family history and personal risk factors.  A complete risk assessment was performed and the results suggest that ABBIE   is at increased risk for breast cancer.  Ms. ALVAREZ was diagnosed with atypical lobular hyperplasia.   She is being conservatively managed by PALLESCHI MD,SUSAN M  with close imaging follow-up planned; no surgical excision recommended at this time.   We discussed that atypical lobular hyperplasia is a high-risk lesion and confers a substantial increase in the risk of subsequent breast cancer in both ipsilateral and contralateral breast.  Atypical lobular hyperplasia is associated with 1- annual cumulative risk of developing invasive breast cancer. Given the atypical hyperplasia and high breast cancer risk score, she is a candidate for primary risk reduction medication.  Options for risk reduction medication in a post-menopausal patient include aromatase inhibitors, raloxifene, tamoxifen.   Given that ABBIE   is postmenopausal and her bone density demonstrates only mild osteopenia, we discussed anastrozole and raloxifene.  We are not recommending Tamoxifen given the family history of leiomyosarcoma and potential association of Tamoxifen with uterine leiomyosarcoma.    Abbie wishes to avoid aromatase inhibitors at this time due to potential for arthralgias and worsening of osteopenia.  Studies examining raloxifene as a chemoprevention demonstrated approximately a 50% reduction in risk of invasive breast cancer. Raloxifene's side effects include hot flashes, peripheral edema, leg cramps and a slight increased risk of venous thrombolic effects (0.7% raloxifene group vs. 0.2% placebo).  There is no increased risk of endometrial cancer associated with raloxifene.   Empiric Risk Calculations: Joseer Chente Score:  40 % LIFETIME risk of developing breast cancer (general population risk 12%) Esther Risk Score: 4.2%  5-year Risk (compared to 1.1 % for women same age/race)   Based on this information the following risk reduction/screening plan has been recommended:   RISK REDUCTION PLAN: - Annual mammogram and breast ultrasound - 12/2024:  Right 6 month fup dx mammogram due 6/2024 - Baseline Breast MRI:  DUE - NOW - Genetics:  Patient does not meet NCCN criteria for genetic testing; we discussed genetic testing as an option for additional information, however, it is not likely to be covered by insurance.  - Chemoprevention: - will start raloxifene 60 mg daily x 5 years; s/e reviewed with patient as noted above - Lifestyle Recommendations:  Limit consumption of alcoholic beverages to no more than one drink equivalent per day; Exercise 150-300 minutes of moderate intensity physical activity per week; Maintain a healthy body weight of 20-25 BMI to help reduce risk of breast cancer.  - continue f/up with gynecologist and PCP for routine medical care. - f/up with [facility] in 6 weeks via TE[facility] for toxicity check and 6 months in office

## 2024-01-30 NOTE — HISTORY OF PRESENT ILLNESS
[de-identified] : Ms. ABBIE ALVAREZ is 54 year female  referred by PALLESCHI MD,SUSAN M for breast cancer risk assessment and risk reduction management based on Her family history and/or personal risk factors for breast cancer.   Ms. ALVAREZ initially presented after a screening mammogram and ultrasound that demonstrated right sided UOQ and R. central calcifications.  On 2023, stereotactic biopsies were performed and demonstrated the following: R. UOQ:  Atypical lobular hyperplasia R. Central: Atypical lobular hyperplasai  Both sites were felt to be concordant with radiographic findings.  Abbie conferred with Dr. Palleschi who referred her to the [facility]ness Program for management. No surgery is being recommended.  RISK ASSESSMENT FACTORS:   GENERAL: Height: 64 inches Weight: 113 lbs BMI:   19 Age of Menarche:  15 Menopausal Status: Postmenopausal LMP 2022 OCP/HRT/Fertility:OCP x years; no fertility/ No HRT Age of Menopause: 52 # pregnancies/live birth:   Age of first live birth: 37                                                                                            BREAST RISK FACTORS:                                                                                      # Breast Biopsies:  2                                                                          Results of biopsies:  2023: ALH as noted above Breast Density:  exteremely dense   FAMILY HISTORY:  See pedigree below;  Family history is significant for mother with leiomyosarcoma of her leg and cervical cancer; Maternal uncle with Prostate Cancer; Father iwith Hodgkins lymphoma and lung cancer Family history of Factor V Leiden mutation -- however, patient underwent testing and does NOT carry mutation. Personal Genetic Testing Results if applicable:  N/A Family Genetic Testing Results:  N/A   SOCIAL HISTORY: Patient denies history of smoking and regular ETOH use.   Exercises regularly 5 x/ week cardio and weight training   IMAGING SUMMARY: Mammogram:  2023 --> 6month f/up right diagnostic mammogram recommended Breast Ultrasound: 2023 Breast MRI:  None Other Screening:  BMD 1/3/2024 - Osteopenia T -1.3 (spine)   HEALTH TEAM: PCP:  Dr. Kai Conway GYN:  Dr. Mya Amaro BREAST SURGEON: Dr. Palleschi        [de-identified] : 1/29/2024 FABRIZIO  denies any breast masses, breast tenderness, skin changes or nipple discharge.

## 2024-01-30 NOTE — PHYSICAL EXAM
[Fully active, able to carry on all pre-disease performance without restriction] : Status 0 - Fully active, able to carry on all pre-disease performance without restriction [Normal] : affect appropriate [de-identified] : very dense bilaterally; R: 9:00 post- biopsy swelling measuring 2 cm; no discrete masses bilaterally

## 2024-01-30 NOTE — REVIEW OF SYSTEMS
[Diarrhea: Grade 0] : Diarrhea: Grade 0 [Negative] : Allergic/Immunologic [FreeTextEntry9] : mild lower back pain and joint aches

## 2024-01-30 NOTE — REASON FOR VISIT
[Initial Consultation] : an initial consultation [Spouse] : spouse [FreeTextEntry2] : Breast Cancer risk assessment/ALH

## 2024-01-30 NOTE — END OF VISIT
[Time Spent: ___ minutes] : I have spent [unfilled] minutes of time on the encounter. [FreeTextEntry3] : Patient seen and examined with JOYCE Carlson Oncology history,medical history, surgical history, family history, social history, OB/GYN history reviewed.  Pathology and imaging reviewed.  Breast exam as above. Patient is at increased risk for breast cancer due to ALH  Chemoprevention with tamoxifen, AI, Raloxifene reviewed. After a thorough discussion, she agreed to take raloxifene.  Breast imaging surveillance plan d/w her.  Genetic testing reviewed, discussed. F/u in breast wellness program closely.  All questions answered.

## 2024-01-30 NOTE — CONSULT LETTER
[Dear  ___] : Dear  [unfilled], [Consult Letter:] : I had the pleasure of evaluating your patient, [unfilled]. [Please see my note below.] : Please see my note below. [Consult Closing:] : Thank you very much for allowing me to participate in the care of this patient.  If you have any questions, please do not hesitate to contact me. [Sincerely,] : Sincerely, [DrMarlin  ___] : Dr. REBOLLEDO [DrMarlin ___] : Dr. REBOLLEDO [FreeTextEntry1] :  FABRIZIO ALVAREZ   was seen as part of the [facility] Cancer Prevention [facility].  She is at increased risk for development of breast cancer and is a candidate for risk reduction medication, enhanced breast imaging and continued monitoring in the Breast Wellness Program. [FreeTextEntry3] : BETZY Carlson MD [facility] Cancer Prevention Program Division of Hematology/[place] [address] Tel: [phone] Fax: [phone]

## 2024-02-02 ENCOUNTER — APPOINTMENT (OUTPATIENT)
Dept: HEMATOLOGY ONCOLOGY | Facility: CLINIC | Age: 54
End: 2024-02-02

## 2024-03-05 ENCOUNTER — APPOINTMENT (OUTPATIENT)
Dept: MRI IMAGING | Facility: CLINIC | Age: 54
End: 2024-03-05
Payer: COMMERCIAL

## 2024-03-05 ENCOUNTER — OUTPATIENT (OUTPATIENT)
Dept: OUTPATIENT SERVICES | Facility: HOSPITAL | Age: 54
LOS: 1 days | End: 2024-03-05
Payer: COMMERCIAL

## 2024-03-05 DIAGNOSIS — Z00.8 ENCOUNTER FOR OTHER GENERAL EXAMINATION: ICD-10-CM

## 2024-03-05 PROCEDURE — A9585: CPT

## 2024-03-05 PROCEDURE — 77049 MRI BREAST C-+ W/CAD BI: CPT | Mod: 26

## 2024-03-05 PROCEDURE — C8937: CPT

## 2024-03-05 PROCEDURE — C8908: CPT

## 2024-03-12 ENCOUNTER — APPOINTMENT (OUTPATIENT)
Dept: HEMATOLOGY ONCOLOGY | Facility: CLINIC | Age: 54
End: 2024-03-12
Payer: COMMERCIAL

## 2024-04-08 ENCOUNTER — OUTPATIENT (OUTPATIENT)
Dept: OUTPATIENT SERVICES | Facility: HOSPITAL | Age: 54
LOS: 1 days | Discharge: HOME | End: 2024-04-08

## 2024-04-08 DIAGNOSIS — R92.1 MAMMOGRAPHIC CALCIFICATION FOUND ON DIAGNOSTIC IMAGING OF BREAST: ICD-10-CM

## 2024-04-11 ENCOUNTER — APPOINTMENT (OUTPATIENT)
Dept: HEMATOLOGY ONCOLOGY | Facility: CLINIC | Age: 54
End: 2024-04-11
Payer: COMMERCIAL

## 2024-04-11 DIAGNOSIS — Z91.89 OTHER SPECIFIED PERSONAL RISK FACTORS, NOT ELSEWHERE CLASSIFIED: ICD-10-CM

## 2024-04-11 PROCEDURE — G2211 COMPLEX E/M VISIT ADD ON: CPT

## 2024-04-11 PROCEDURE — 99212 OFFICE O/P EST SF 10 MIN: CPT

## 2024-04-11 NOTE — ASSESSMENT
[FreeTextEntry1] : ABBIE  is a 54 year   year  year old female who presented today to review her risk of developing of breast cancer based on her family history and personal risk factors.  A complete risk assessment was performed and the results suggest that ABBIE   is at increased risk for breast cancer.  Ms. ALVAREZ was diagnosed with atypical lobular hyperplasia.   She is being conservatively managed by PALLESCHI MD,SUSAN M  with close imaging follow-up planned; no surgical excision recommended at this time.   We discussed that atypical lobular hyperplasia is a high-risk lesion and confers a substantial increase in the risk of subsequent breast cancer in both ipsilateral and contralateral breast.  Atypical lobular hyperplasia is associated with 1- annual cumulative risk of developing invasive breast cancer. Given the atypical hyperplasia and high breast cancer risk score, she is a candidate for primary risk reduction medication.  Options for risk reduction medication in a post-menopausal patient include aromatase inhibitors, raloxifene, tamoxifen.   Given that ABBIE   is postmenopausal and her bone density demonstrates only mild osteopenia, we discussed anastrozole and raloxifene.  We are not recommending Tamoxifen given the family history of leiomyosarcoma and potential association of Tamoxifen with uterine leiomyosarcoma.    Abbie wishes to avoid aromatase inhibitors at this time due to potential for arthralgias and worsening of osteopenia.  Studies examining raloxifene as a chemoprevention demonstrated approximately a 50% reduction in risk of invasive breast cancer. Raloxifene's side effects include hot flashes, peripheral edema, leg cramps and a slight increased risk of venous thrombolic effects (0.7% raloxifene group vs. 0.2% placebo).  There is no increased risk of endometrial cancer associated with raloxifene.   Empiric Risk Calculations: Joseer Chente Score:  40 % LIFETIME risk of developing breast cancer (general population risk 12%) Esther Risk Score: 4.2%  5-year Risk (compared to 1.1 % for women same age/race)   Based on this information the following risk reduction/screening plan has been recommended:   RISK REDUCTION PLAN: - Annual mammogram and breast ultrasound - 12/2024:  Right 6 month fup dx mammogram due 6/2024 (ordered) - Baseline Breast MRI: 3/2024- no additional suspicious enhancement - Genetics:  Patient does not meet NCCN criteria for genetic testing; we discussed genetic testing as an option for additional information, however, it is not likely to be covered by insurance.  - Chemoprevention: - continue raloxifene 60 mg daily x 5 years; s/e reviewed with patient as noted above Plan 3/24/2024- 3/24/2029) tolerating treatment well.  - Lifestyle Recommendations:  Limit consumption of alcoholic beverages to no more than one drink equivalent per day; Exercise 150-300 minutes of moderate intensity physical activity per week; Maintain a healthy body weight of 20-25 BMI to help reduce risk of breast cancer.  - continue f/up with gynecologist and PCP for routine medical care. - f/up with Breast Wellness Program  6 months in office  Patient had an opportunity to have her questions asked and answered to her satisfaction.

## 2024-04-11 NOTE — END OF VISIT
[FreeTextEntry3] : Patient seen and examined with JOYCE Carlson Oncology history,medical history, surgical history, family history, social history, OB/GYN history reviewed.  Pathology and imaging reviewed.  Breast exam as above. Patient is at increased risk for breast cancer due to ALH  Chemoprevention with tamoxifen, AI, Raloxifene reviewed. After a thorough discussion, she agreed to take raloxifene.  Breast imaging surveillance plan d/w her.  Genetic testing reviewed, discussed. F/u in breast wellness program closely.  All questions answered. [Time Spent: ___ minutes] : I have spent [unfilled] minutes of time on the encounter.

## 2024-04-11 NOTE — HISTORY OF PRESENT ILLNESS
[de-identified] : Ms. ABBIE ALVAREZ is 54 year female  referred by PALLESCHI MD,SUSAN M for breast cancer risk assessment and risk reduction management based on Her family history and/or personal risk factors for breast cancer.   Ms. ALVAREZ initially presented after a screening mammogram and ultrasound that demonstrated right sided UOQ and R. central calcifications.  On 2023, stereotactic biopsies were performed and demonstrated the following: R. UOQ:  Atypical lobular hyperplasia R. Central: Atypical lobular hyperplasai  Both sites were felt to be concordant with radiographic findings.  Abbie conferred with Dr. Palleschi who referred her to the Breast Wellness Program for management. No surgery is being recommended.  RISK ASSESSMENT FACTORS:   GENERAL: Height: 64 inches Weight: 113 lbs BMI:   19 Age of Menarche:  15 Menopausal Status: Postmenopausal LMP 2022 OCP/HRT/Fertility:OCP x years; no fertility/ No HRT Age of Menopause: 52 # pregnancies/live birth:   Age of first live birth: 37                                                                                            BREAST RISK FACTORS:                                                                                      # Breast Biopsies:  2                                                                          Results of biopsies:  2023: ALH as noted above Breast Density:  exteremely dense   FAMILY HISTORY:  See pedigree below;  Family history is significant for mother with leiomyosarcoma of her leg and cervical cancer; Maternal uncle with Prostate Cancer; Father iwith Hodgkins lymphoma and lung cancer Family history of Factor V Leiden mutation -- however, patient underwent testing and does NOT carry mutation. Personal Genetic Testing Results if applicable:  N/A Family Genetic Testing Results:  N/A   SOCIAL HISTORY: Patient denies history of smoking and regular ETOH use.   Exercises regularly 5 x/ week cardio and weight training   IMAGING SUMMARY: Mammogram:  2023 --> 6month f/up right diagnostic mammogram recommended Breast Ultrasound: 2023 Breast MRI:  None Other Screening:  BMD 1/3/2024 - Osteopenia T -1.3 (spine)   HEALTH TEAM: PCP:  Dr. Kai Conway GYN:  Dr. Mya Amaro BREAST SURGEON: Dr. Palleschi        [de-identified] : 4/11/2024 Patient presents today for management of her high risk of developing breast cancer and to assess treatment toxicity of FABRIZIO  denies any breast masses, breast tenderness, skin changes or nipple discharge. Since our initial visit, Fabrizio consulted with [facility] who concurred with our recommendations. Fabrizio Started raloxifene 3/24/2024 - tolerating well. FABRIZIO  has not experienced any significant hotflashes, arthralgias, vaginal dryness, vaginal bleeding, hair loss, muscle cramps.  IMAGING SUMMARY: Mammogram:  12/17/2023 --> 6month f/up right diagnostic mammogram recommended Breast Ultrasound: 12/17/2023 Breast MRI:  3/5/2024 --> Birads 3.0 --> biopsy proven ALH no suspicious enhancement  Other Screening:  BMD 1/3/2024 - Osteopenia T -1.3 (spine)

## 2024-04-11 NOTE — PHYSICAL EXAM
[Fully active, able to carry on all pre-disease performance without restriction] : Status 0 - Fully active, able to carry on all pre-disease performance without restriction [Normal] : affect appropriate [de-identified] : (deferred TEB 4/11/2024) very dense bilaterally; R: 9:00 post- biopsy swelling measuring 2 cm; no discrete masses bilaterally

## 2024-04-11 NOTE — CONSULT LETTER
[Dear  ___] : Dear  [unfilled], [Consult Letter:] : I had the pleasure of evaluating your patient, [unfilled]. [Please see my note below.] : Please see my note below. [Consult Closing:] : Thank you very much for allowing me to participate in the care of this patient.  If you have any questions, please do not hesitate to contact me. [Sincerely,] : Sincerely, [FreeTextEntry1] :  FABRIZIO ALVAREZ   was seen as part of the [facility] Breast Wellness and Cancer Prevention Program.  She is at increased risk for development of breast cancer and is a candidate for risk reduction medication, enhanced breast imaging and continued monitoring in the Breast Wellness Program. [FreeTextEntry3] : BETZY Carlson MD Breast Wellness and Cancer Prevention Program Division of Hematology/[place] [address] Tel: [phone] Fax: [phone] [DrMarlin  ___] : Dr. REBOLLEDO [DrMarlin ___] : Dr. REBOLLEDO

## 2024-04-11 NOTE — REASON FOR VISIT
[Home] : at home, [unfilled] , at the time of the visit. [Medical Office: ([facility] [place] [facility])___] : at the medical office located in  [Patient] : the patient [Follow-Up Visit] : a follow-up [Spouse] : spouse [FreeTextEntry2] : Breast Cancer risk assessment/ALH

## 2024-05-03 ENCOUNTER — NON-APPOINTMENT (OUTPATIENT)
Age: 54
End: 2024-05-03

## 2024-05-03 DIAGNOSIS — N60.91 UNSPECIFIED BENIGN MAMMARY DYSPLASIA OF RIGHT BREAST: ICD-10-CM

## 2024-05-23 ENCOUNTER — NON-APPOINTMENT (OUTPATIENT)
Age: 54
End: 2024-05-23

## 2024-08-21 ENCOUNTER — OUTPATIENT (OUTPATIENT)
Dept: OUTPATIENT SERVICES | Facility: HOSPITAL | Age: 54
LOS: 1 days | Discharge: HOME | End: 2024-08-21

## 2024-08-21 DIAGNOSIS — R92.1 MAMMOGRAPHIC CALCIFICATION FOUND ON DIAGNOSTIC IMAGING OF BREAST: ICD-10-CM

## 2024-09-03 ENCOUNTER — APPOINTMENT (OUTPATIENT)
Dept: HEMATOLOGY ONCOLOGY | Facility: CLINIC | Age: 54
End: 2024-09-03

## 2024-09-11 ENCOUNTER — NON-APPOINTMENT (OUTPATIENT)
Age: 54
End: 2024-09-11

## 2024-09-12 ENCOUNTER — APPOINTMENT (OUTPATIENT)
Dept: HEMATOLOGY ONCOLOGY | Facility: CLINIC | Age: 54
End: 2024-09-12
Payer: COMMERCIAL

## 2024-09-12 VITALS
SYSTOLIC BLOOD PRESSURE: 122 MMHG | WEIGHT: 111.31 LBS | TEMPERATURE: 97.3 F | DIASTOLIC BLOOD PRESSURE: 80 MMHG | RESPIRATION RATE: 16 BRPM | BODY MASS INDEX: 19.12 KG/M2 | OXYGEN SATURATION: 99 % | HEART RATE: 54 BPM

## 2024-09-12 DIAGNOSIS — Z91.89 OTHER SPECIFIED PERSONAL RISK FACTORS, NOT ELSEWHERE CLASSIFIED: ICD-10-CM

## 2024-09-12 DIAGNOSIS — N60.91 UNSPECIFIED BENIGN MAMMARY DYSPLASIA OF RIGHT BREAST: ICD-10-CM

## 2024-09-12 PROCEDURE — 99214 OFFICE O/P EST MOD 30 MIN: CPT

## 2024-09-12 PROCEDURE — G2211 COMPLEX E/M VISIT ADD ON: CPT

## 2024-09-12 NOTE — END OF VISIT
[Time Spent: ___ minutes] : I have spent [unfilled] minutes of time on the encounter which excludes teaching and separately reported services. [FreeTextEntry3] : .

## 2024-09-12 NOTE — REASON FOR VISIT
[Follow-Up Visit] : a follow-up [Spouse] : spouse [Home] : at home, [unfilled] , at the time of the visit. [Medical Office: ([facility])___] : at the medical office located in  [Patient] : the patient [FreeTextEntry2] : Breast Cancer risk assessment/ALH

## 2024-09-12 NOTE — CONSULT LETTER
[Dear  ___] : Dear  [unfilled], [Consult Letter:] : I had the pleasure of evaluating your patient, [unfilled]. [Please see my note below.] : Please see my note below. [Consult Closing:] : Thank you very much for allowing me to participate in the care of this patient.  If you have any questions, please do not hesitate to contact me. [Sincerely,] : Sincerely, [DrMarlin  ___] : Dr. REBOLLEDO [DrMarlin ___] : Dr. REBOLLEDO [FreeTextEntry1] :  FABRIZIO ALVAREZ   was seen as part of the [facility] Breast Wellness and Cancer Prevention Program.  She is at increased risk for development of breast cancer and is a candidate for risk reduction medication, enhanced breast imaging and continued monitoring in the Breast Wellness Program. [FreeTextEntry3] : BETZY Carlson MD Breast Wellness and Cancer Prevention Program Division of Hematology/[place] [address] Tel: [phone] Fax: [phone]

## 2024-09-12 NOTE — PHYSICAL EXAM
[Fully active, able to carry on all pre-disease performance without restriction] : Status 0 - Fully active, able to carry on all pre-disease performance without restriction [Normal] : affect appropriate [de-identified] :  very dense bilaterally; R: 9:00 measuring 2 cm (unchanged); no discrete masses bilaterally

## 2024-09-12 NOTE — HISTORY OF PRESENT ILLNESS
[de-identified] : Ms. ABBIE ALVAREZ is 54 year female  referred by PALLESCHI MD,SUSAN M for breast cancer risk assessment and risk reduction management based on Her family history and/or personal risk factors for breast cancer.   Ms. ALVAREZ initially presented after a screening mammogram and ultrasound that demonstrated right sided UOQ and R. central calcifications.  On 2023, stereotactic biopsies were performed and demonstrated the following: R. UOQ:  Atypical lobular hyperplasia R. Central: Atypical lobular hyperplasai  Both sites were felt to be concordant with radiographic findings.  Abbie conferred with Dr. Palleschi who referred her to the Breast Wellness Program for management. No surgery is being recommended.  RISK ASSESSMENT FACTORS:   GENERAL: Height: 64 inches Weight: 113 lbs BMI:   19 Age of Menarche:  15 Menopausal Status: Postmenopausal LMP 2022 OCP/HRT/Fertility:OCP x years; no fertility/ No HRT Age of Menopause: 52 # pregnancies/live birth:   Age of first live birth: 37                                                                                            BREAST RISK FACTORS:                                                                                      # Breast Biopsies:  2                                                                          Results of biopsies:  2023: ALH as noted above Breast Density:  exteremely dense   FAMILY HISTORY:  See pedigree below;  Family history is significant for mother with leiomyosarcoma of her leg and cervical cancer; Maternal uncle with Prostate Cancer; Father iwith Hodgkins lymphoma and lung cancer Family history of Factor V Leiden mutation -- however, patient underwent testing and does NOT carry mutation. Personal Genetic Testing Results if applicable:  N/A Family Genetic Testing Results:  N/A   SOCIAL HISTORY: Patient denies history of smoking and regular ETOH use.   Exercises regularly 5 x/ week cardio and weight training   IMAGING SUMMARY: Mammogram:  2023 --> 6month f/up right diagnostic mammogram recommended Breast Ultrasound: 2023 Breast MRI:  None Other Screening:  BMD 1/3/2024 - Osteopenia T -1.3 (spine)   HEALTH TEAM: PCP:  Dr. Kai Conway GYN:  Dr. Mya Amaro BREAST SURGEON: Dr. Palleschi        [de-identified] : 9/12/2024 Patient presents today for management of her high risk of developing breast cancer and to assess treatment toxicity of FABRIZIO  denies any breast masses, breast tenderness, skin changes or nipple discharge. Since our initial visit, Fabrizio consulted with [facility] who concurred with our recommendations. Fabrizio Started raloxifene 3/24/2024 - tolerating well. FABRIZIO  has not experienced any significant hotflashes, arthralgias, vaginal dryness, vaginal bleeding, hair loss, muscle cramps. Gyn visit scheduled 12/2024  IMAGING SUMMARY: Mammogram:  12/17/2023 --> 6month f/up right diagnostic mammogram recommended (not done) Breast Ultrasound: 12/17/2023 Breast MRI:  3/5/2024 --> Birads 3.0 --> biopsy proven ALH no suspicious enhancement  Other Screening:  BMD 1/3/2024 - Osteopenia T -1.3 (spine)

## 2024-10-09 ENCOUNTER — NON-APPOINTMENT (OUTPATIENT)
Age: 54
End: 2024-10-09

## 2024-10-10 DIAGNOSIS — R92.1 MAMMOGRAPHIC CALCIFICATION FOUND ON DIAGNOSTIC IMAGING OF BREAST: ICD-10-CM

## 2024-10-23 ENCOUNTER — RESULT REVIEW (OUTPATIENT)
Age: 54
End: 2024-10-23

## 2024-10-23 ENCOUNTER — APPOINTMENT (OUTPATIENT)
Dept: MAMMOGRAPHY | Facility: IMAGING CENTER | Age: 54
End: 2024-10-23
Payer: COMMERCIAL

## 2024-10-23 ENCOUNTER — OUTPATIENT (OUTPATIENT)
Dept: OUTPATIENT SERVICES | Facility: HOSPITAL | Age: 54
LOS: 1 days | End: 2024-10-23
Payer: COMMERCIAL

## 2024-10-23 DIAGNOSIS — Z00.8 ENCOUNTER FOR OTHER GENERAL EXAMINATION: ICD-10-CM

## 2024-10-23 DIAGNOSIS — R92.1 MAMMOGRAPHIC CALCIFICATION FOUND ON DIAGNOSTIC IMAGING OF BREAST: ICD-10-CM

## 2024-10-23 PROCEDURE — 77065 DX MAMMO INCL CAD UNI: CPT | Mod: 26,RT

## 2024-10-23 PROCEDURE — 19081 BX BREAST 1ST LESION STRTCTC: CPT | Mod: RT

## 2024-10-24 ENCOUNTER — RESULT REVIEW (OUTPATIENT)
Age: 54
End: 2024-10-24

## 2024-10-24 PROCEDURE — 88305 TISSUE EXAM BY PATHOLOGIST: CPT | Mod: 26

## 2024-10-24 PROCEDURE — 88305 TISSUE EXAM BY PATHOLOGIST: CPT

## 2024-10-24 PROCEDURE — 77065 DX MAMMO INCL CAD UNI: CPT

## 2024-10-24 PROCEDURE — A4648: CPT

## 2024-10-24 PROCEDURE — 76098 X-RAY EXAM SURGICAL SPECIMEN: CPT | Mod: 26

## 2024-10-24 PROCEDURE — 19081 BX BREAST 1ST LESION STRTCTC: CPT

## 2024-10-25 ENCOUNTER — NON-APPOINTMENT (OUTPATIENT)
Age: 54
End: 2024-10-25

## 2024-10-25 LAB — SURGICAL PATHOLOGY STUDY: SIGNIFICANT CHANGE UP

## 2024-11-05 ENCOUNTER — OUTPATIENT (OUTPATIENT)
Dept: OUTPATIENT SERVICES | Facility: HOSPITAL | Age: 54
LOS: 1 days | Discharge: HOME | End: 2024-11-05

## 2024-11-05 DIAGNOSIS — R92.1 MAMMOGRAPHIC CALCIFICATION FOUND ON DIAGNOSTIC IMAGING OF BREAST: ICD-10-CM

## 2024-11-08 ENCOUNTER — APPOINTMENT (OUTPATIENT)
Dept: HEMATOLOGY ONCOLOGY | Facility: CLINIC | Age: 54
End: 2024-11-08

## 2024-11-08 ENCOUNTER — NON-APPOINTMENT (OUTPATIENT)
Age: 54
End: 2024-11-08

## 2024-11-08 DIAGNOSIS — N60.91 UNSPECIFIED BENIGN MAMMARY DYSPLASIA OF RIGHT BREAST: ICD-10-CM

## 2024-11-08 DIAGNOSIS — Z91.89 OTHER SPECIFIED PERSONAL RISK FACTORS, NOT ELSEWHERE CLASSIFIED: ICD-10-CM

## 2024-11-08 DIAGNOSIS — D05.01 LOBULAR CARCINOMA IN SITU OF RIGHT BREAST: ICD-10-CM

## 2024-11-08 PROCEDURE — 99213 OFFICE O/P EST LOW 20 MIN: CPT

## 2025-01-24 ENCOUNTER — OUTPATIENT (OUTPATIENT)
Dept: OUTPATIENT SERVICES | Facility: HOSPITAL | Age: 55
LOS: 1 days | End: 2025-01-24
Payer: COMMERCIAL

## 2025-01-24 ENCOUNTER — RESULT REVIEW (OUTPATIENT)
Age: 55
End: 2025-01-24

## 2025-01-24 ENCOUNTER — APPOINTMENT (OUTPATIENT)
Dept: ULTRASOUND IMAGING | Facility: IMAGING CENTER | Age: 55
End: 2025-01-24
Payer: COMMERCIAL

## 2025-01-24 ENCOUNTER — NON-APPOINTMENT (OUTPATIENT)
Age: 55
End: 2025-01-24

## 2025-01-24 ENCOUNTER — APPOINTMENT (OUTPATIENT)
Dept: MAMMOGRAPHY | Facility: IMAGING CENTER | Age: 55
End: 2025-01-24
Payer: COMMERCIAL

## 2025-01-24 DIAGNOSIS — Z00.8 ENCOUNTER FOR OTHER GENERAL EXAMINATION: ICD-10-CM

## 2025-01-24 DIAGNOSIS — R92.1 MAMMOGRAPHIC CALCIFICATION FOUND ON DIAGNOSTIC IMAGING OF BREAST: ICD-10-CM

## 2025-01-24 PROCEDURE — G0279: CPT | Mod: 26

## 2025-01-24 PROCEDURE — 76641 ULTRASOUND BREAST COMPLETE: CPT | Mod: 26,50

## 2025-01-24 PROCEDURE — 77066 DX MAMMO INCL CAD BI: CPT | Mod: 26

## 2025-01-24 PROCEDURE — G0279: CPT

## 2025-01-24 PROCEDURE — 77066 DX MAMMO INCL CAD BI: CPT

## 2025-01-24 PROCEDURE — 76641 ULTRASOUND BREAST COMPLETE: CPT

## 2025-01-28 DIAGNOSIS — N60.91 UNSPECIFIED BENIGN MAMMARY DYSPLASIA OF RIGHT BREAST: ICD-10-CM

## 2025-01-28 DIAGNOSIS — D05.01 LOBULAR CARCINOMA IN SITU OF RIGHT BREAST: ICD-10-CM

## 2025-03-26 ENCOUNTER — OUTPATIENT (OUTPATIENT)
Dept: OUTPATIENT SERVICES | Facility: HOSPITAL | Age: 55
LOS: 1 days | Discharge: HOME | End: 2025-03-26

## 2025-03-26 DIAGNOSIS — R92.1 MAMMOGRAPHIC CALCIFICATION FOUND ON DIAGNOSTIC IMAGING OF BREAST: ICD-10-CM

## 2025-03-28 ENCOUNTER — APPOINTMENT (OUTPATIENT)
Dept: HEMATOLOGY ONCOLOGY | Facility: CLINIC | Age: 55
End: 2025-03-28
Payer: COMMERCIAL

## 2025-03-28 VITALS
HEIGHT: 63.86 IN | DIASTOLIC BLOOD PRESSURE: 67 MMHG | TEMPERATURE: 98.3 F | WEIGHT: 115.84 LBS | BODY MASS INDEX: 20.02 KG/M2 | OXYGEN SATURATION: 99 % | HEART RATE: 52 BPM | SYSTOLIC BLOOD PRESSURE: 104 MMHG

## 2025-03-28 DIAGNOSIS — D05.01 LOBULAR CARCINOMA IN SITU OF RIGHT BREAST: ICD-10-CM

## 2025-03-28 DIAGNOSIS — Z91.89 OTHER SPECIFIED PERSONAL RISK FACTORS, NOT ELSEWHERE CLASSIFIED: ICD-10-CM

## 2025-03-28 DIAGNOSIS — N60.91 UNSPECIFIED BENIGN MAMMARY DYSPLASIA OF RIGHT BREAST: ICD-10-CM

## 2025-03-28 PROCEDURE — 99214 OFFICE O/P EST MOD 30 MIN: CPT

## 2025-06-03 ENCOUNTER — OUTPATIENT (OUTPATIENT)
Dept: OUTPATIENT SERVICES | Facility: HOSPITAL | Age: 55
LOS: 1 days | End: 2025-06-03
Payer: COMMERCIAL

## 2025-06-03 ENCOUNTER — APPOINTMENT (OUTPATIENT)
Dept: MRI IMAGING | Facility: CLINIC | Age: 55
End: 2025-06-03
Payer: COMMERCIAL

## 2025-06-03 DIAGNOSIS — D05.01 LOBULAR CARCINOMA IN SITU OF RIGHT BREAST: ICD-10-CM

## 2025-06-03 PROCEDURE — C8908: CPT

## 2025-06-03 PROCEDURE — C8937: CPT

## 2025-06-03 PROCEDURE — 77049 MRI BREAST C-+ W/CAD BI: CPT | Mod: 26

## 2025-06-03 PROCEDURE — A9585: CPT

## 2025-06-18 NOTE — ASSESSMENT
[FreeTextEntry1] : ABBIE  is a 54 year   year  year old female who presented today to review her risk of developing of breast cancer based on her family history and personal risk factors.  A complete risk assessment was performed and the results suggest that ABBIE   is at increased risk for breast cancer.  Ms. ALVAREZ was diagnosed with atypical lobular hyperplasia.   She is being conservatively managed by PALLESCHI MD,SUSAN M  with close imaging follow-up planned; no surgical excision recommended at this time.   We discussed that atypical lobular hyperplasia is a high-risk lesion and confers a substantial increase in the risk of subsequent breast cancer in both ipsilateral and contralateral breast.  Atypical lobular hyperplasia is associated with 1- annual cumulative risk of developing invasive breast cancer. Given the atypical hyperplasia and high breast cancer risk score, she is a candidate for primary risk reduction medication.  Options for risk reduction medication in a post-menopausal patient include aromatase inhibitors, raloxifene, tamoxifen.   Given that ABBIE   is postmenopausal and her bone density demonstrates only mild osteopenia, we discussed anastrozole and raloxifene.  We are not recommending Tamoxifen given the family history of leiomyosarcoma and potential association of Tamoxifen with uterine leiomyosarcoma.    Abbie wishes to avoid aromatase inhibitors at this time due to potential for arthralgias and worsening of osteopenia.  Studies examining raloxifene as a chemoprevention demonstrated approximately a 50% reduction in risk of invasive breast cancer. Raloxifene's side effects include hot flashes, peripheral edema, leg cramps and a slight increased risk of venous thrombolic effects (0.7% raloxifene group vs. 0.2% placebo).  There is no increased risk of endometrial cancer associated with raloxifene.   Empiric Risk Calculations: Tyrer Chente Score:  40 % LIFETIME risk of developing breast cancer (general population risk 12%) Esther Risk Score: 4.2%  5-year Risk (compared to 1.1 % for women same age/race)   Based on this information the following risk reduction/screening plan has been recommended:   RISK REDUCTION PLAN: - Annual mammogram and breast ultrasound - 12/2024:  Right 6 month fup dx mammogram due 6/2024 (ordered) reminded patient this is past due* - Baseline Breast MRI: 3/2024- no additional suspicious enhancement - Genetics:  Patient does not meet NCCN criteria for genetic testing; we discussed genetic testing as an option for additional information, however, it is not likely to be covered by insurance.  - Chemoprevention: - continue raloxifene 60 mg daily x 5 years; s/e reviewed with patient as noted above Plan 3/24/2024- 3/24/2029) tolerating treatment well.  - Lifestyle Recommendations:  Limit consumption of alcoholic beverages to no more than one drink equivalent per day; Exercise 150-300 minutes of moderate intensity physical activity per week; Maintain a healthy body weight of 20-25 BMI to help reduce risk of breast cancer.  - continue f/up with gynecologist and PCP for routine medical care. - f/up with Breast Wellness Program  6 months in office  Patient had an opportunity to have her questions asked and answered to her satisfaction.
Heterosexual

## 2025-08-14 ENCOUNTER — APPOINTMENT (OUTPATIENT)
Dept: HEMATOLOGY ONCOLOGY | Facility: CLINIC | Age: 55
End: 2025-08-14

## 2025-08-14 PROCEDURE — 99213 OFFICE O/P EST LOW 20 MIN: CPT | Mod: 95

## 2025-09-12 ENCOUNTER — NON-APPOINTMENT (OUTPATIENT)
Age: 55
End: 2025-09-12